# Patient Record
Sex: FEMALE | Race: WHITE | URBAN - NONMETROPOLITAN AREA
[De-identification: names, ages, dates, MRNs, and addresses within clinical notes are randomized per-mention and may not be internally consistent; named-entity substitution may affect disease eponyms.]

---

## 2020-09-10 ENCOUNTER — OFFICE VISIT (OUTPATIENT)
Dept: URBAN - NONMETROPOLITAN AREA CLINIC 2 | Facility: CLINIC | Age: 35
End: 2020-09-10

## 2020-09-24 ENCOUNTER — OFFICE VISIT (OUTPATIENT)
Dept: URBAN - NONMETROPOLITAN AREA CLINIC 2 | Facility: CLINIC | Age: 35
End: 2020-09-24
Payer: COMMERCIAL

## 2020-09-24 ENCOUNTER — WEB ENCOUNTER (OUTPATIENT)
Dept: URBAN - NONMETROPOLITAN AREA CLINIC 2 | Facility: CLINIC | Age: 35
End: 2020-09-24

## 2020-09-24 ENCOUNTER — TELEPHONE ENCOUNTER (OUTPATIENT)
Dept: URBAN - METROPOLITAN AREA CLINIC 92 | Facility: CLINIC | Age: 35
End: 2020-09-24

## 2020-09-24 ENCOUNTER — DASHBOARD ENCOUNTERS (OUTPATIENT)
Age: 35
End: 2020-09-24

## 2020-09-24 DIAGNOSIS — R10.84 ABDOMINAL CRAMPING, GENERALIZED: ICD-10-CM

## 2020-09-24 DIAGNOSIS — R19.8 IRREGULAR BOWEL HABITS: ICD-10-CM

## 2020-09-24 DIAGNOSIS — K21.9 GERD (GASTROESOPHAGEAL REFLUX DISEASE): ICD-10-CM

## 2020-09-24 DIAGNOSIS — Z83.79 FAMILY HISTORY OF CROHN'S DISEASE: ICD-10-CM

## 2020-09-24 PROBLEM — 444702007 IRREGULAR BOWEL HABITS: Status: ACTIVE | Noted: 2020-09-24

## 2020-09-24 PROBLEM — 160386006 FAMILY HISTORY OF CROHN'S DISEASE: Status: ACTIVE | Noted: 2020-09-24

## 2020-09-24 PROBLEM — 235595009 GASTROESOPHAGEAL REFLUX DISEASE: Status: ACTIVE | Noted: 2020-09-24

## 2020-09-24 PROBLEM — 21522001 ABDOMINAL PAIN: Status: ACTIVE | Noted: 2020-09-24

## 2020-09-24 PROBLEM — 275129008 FAMILY HISTORY OF ULCERATIVE COLITIS: Status: ACTIVE | Noted: 2020-09-24

## 2020-09-24 PROBLEM — 312399001 THYROID FUNCTION TESTS ABNORMAL: Status: ACTIVE | Noted: 2020-09-24

## 2020-09-24 PROCEDURE — G8427 DOCREV CUR MEDS BY ELIG CLIN: HCPCS | Performed by: INTERNAL MEDICINE

## 2020-09-24 PROCEDURE — G8938 BMI DOC ONL FUP NT DOC: HCPCS | Performed by: INTERNAL MEDICINE

## 2020-09-24 PROCEDURE — 1036F TOBACCO NON-USER: CPT | Performed by: INTERNAL MEDICINE

## 2020-09-24 PROCEDURE — 99203 OFFICE O/P NEW LOW 30 MIN: CPT | Performed by: INTERNAL MEDICINE

## 2020-09-24 PROCEDURE — G9903 PT SCRN TBCO ID AS NON USER: HCPCS | Performed by: INTERNAL MEDICINE

## 2020-09-24 RX ORDER — LANSOPRAZOLE 15 MG/1
1 CAPSULE BEFORE A MEAL CAPSULE, DELAYED RELEASE ORAL ONCE A DAY
Qty: 90 | Refills: 3 | OUTPATIENT
Start: 2020-09-24

## 2020-09-24 RX ORDER — NORETHINDRONE ACETATE AND ETHINYL ESTRADIOL, ETHINYL ESTRADIOL AND FERROUS FUMARATE 1MG-10(24)
1 TABLET KIT ORAL ONCE A DAY
Status: ACTIVE | COMMUNITY

## 2020-09-24 NOTE — PHYSICAL EXAM NECK/THYROID:
normal appearance,  without tenderness upon palpation,  no deformities,  no cervical lymphadenopathy,  no masses, ?? thyroid nodules,  Thyroid normal size,  no JVD,  thyroid nontender

## 2020-09-24 NOTE — HPI-TODAY'S VISIT:
34 yo F spec  had a LC 2/2020 for gallstones at [facility] starting in 7/2020 will have episodic bouts of abd pain.  pain is in same spot at the 11:00 position to her LC scar superior  to her umbilicus when occurs, lasts 3-4 days...feels a "lump that moves"  when pressed. but, no overt  distension.  if she eats, the pain inc. so, will take primarily liq until the pain resolves. had signif gerd w her 2 pregnancies. gen none until this last yr. is taking otc prevacid w good control. bms are qd and nl. but, certain trigger foods cause loose stools-sausage, salads.

## 2020-10-14 ENCOUNTER — TELEPHONE ENCOUNTER (OUTPATIENT)
Dept: URBAN - METROPOLITAN AREA CLINIC 92 | Facility: CLINIC | Age: 35
End: 2020-10-14

## 2020-10-14 PROBLEM — 3716002 THYROMEGALY: Status: ACTIVE | Noted: 2020-10-14

## 2020-10-16 ENCOUNTER — TELEPHONE ENCOUNTER (OUTPATIENT)
Dept: URBAN - NONMETROPOLITAN AREA CLINIC 2 | Facility: CLINIC | Age: 35
End: 2020-10-16

## 2020-12-31 LAB — TSH: 0.77

## 2021-02-18 ENCOUNTER — TELEPHONE ENCOUNTER (OUTPATIENT)
Dept: URBAN - METROPOLITAN AREA CLINIC 23 | Facility: CLINIC | Age: 36
End: 2021-02-18